# Patient Record
Sex: MALE | Race: WHITE | NOT HISPANIC OR LATINO | Employment: PART TIME | URBAN - METROPOLITAN AREA
[De-identification: names, ages, dates, MRNs, and addresses within clinical notes are randomized per-mention and may not be internally consistent; named-entity substitution may affect disease eponyms.]

---

## 2017-03-27 ENCOUNTER — HOSPITAL ENCOUNTER (INPATIENT)
Facility: HOSPITAL | Age: 27
LOS: 3 days | Discharge: HOME | DRG: 897 | End: 2017-03-30
Attending: PSYCHIATRY & NEUROLOGY | Admitting: PSYCHIATRY & NEUROLOGY
Payer: MEDICAID

## 2017-03-27 DIAGNOSIS — F19.94 SUBSTANCE INDUCED MOOD DISORDER: ICD-10-CM

## 2017-03-27 DIAGNOSIS — F15.20 AMPHETAMINE USE DISORDER, SEVERE: ICD-10-CM

## 2017-03-27 DIAGNOSIS — F12.20 CANNABIS USE DISORDER, SEVERE, DEPENDENCE: ICD-10-CM

## 2017-03-27 DIAGNOSIS — F13.20 MODERATE OTHER SEDATIVE OR HYPNOTIC USE DISORDER: ICD-10-CM

## 2017-03-27 DIAGNOSIS — F15.94 AMPHETAMINE-INDUCED MOOD DISORDER: ICD-10-CM

## 2017-03-27 DIAGNOSIS — F60.9 UNSPECIFIED PERSONALITY DISORDER: ICD-10-CM

## 2017-03-27 PROBLEM — F34.1 DYSTHYMIA: Status: ACTIVE | Noted: 2017-03-27

## 2017-03-27 PROCEDURE — 12400001 HC PSYCH SEMI-PRIVATE ROOM

## 2017-03-27 RX ORDER — DOCUSATE SODIUM 100 MG/1
100 CAPSULE, LIQUID FILLED ORAL DAILY PRN
Status: DISCONTINUED | OUTPATIENT
Start: 2017-03-27 | End: 2017-03-30 | Stop reason: HOSPADM

## 2017-03-27 RX ORDER — IBUPROFEN 200 MG
24 TABLET ORAL
Status: DISCONTINUED | OUTPATIENT
Start: 2017-03-27 | End: 2017-03-30 | Stop reason: HOSPADM

## 2017-03-27 RX ORDER — LOPERAMIDE HYDROCHLORIDE 2 MG/1
2 CAPSULE ORAL
Status: DISCONTINUED | OUTPATIENT
Start: 2017-03-27 | End: 2017-03-30 | Stop reason: HOSPADM

## 2017-03-27 RX ORDER — OLANZAPINE 10 MG/1
10 TABLET ORAL EVERY 4 HOURS PRN
Status: DISCONTINUED | OUTPATIENT
Start: 2017-03-27 | End: 2017-03-30 | Stop reason: HOSPADM

## 2017-03-27 RX ORDER — OLANZAPINE 10 MG/2ML
10 INJECTION, POWDER, FOR SOLUTION INTRAMUSCULAR EVERY 4 HOURS PRN
Status: DISCONTINUED | OUTPATIENT
Start: 2017-03-27 | End: 2017-03-30 | Stop reason: HOSPADM

## 2017-03-27 RX ORDER — GLUCAGON 1 MG
1 KIT INJECTION
Status: DISCONTINUED | OUTPATIENT
Start: 2017-03-27 | End: 2017-03-30 | Stop reason: HOSPADM

## 2017-03-27 RX ORDER — IBUPROFEN 200 MG
16 TABLET ORAL
Status: DISCONTINUED | OUTPATIENT
Start: 2017-03-27 | End: 2017-03-30 | Stop reason: HOSPADM

## 2017-03-27 RX ORDER — HYDROXYZINE PAMOATE 50 MG/1
50 CAPSULE ORAL NIGHTLY PRN
Status: DISCONTINUED | OUTPATIENT
Start: 2017-03-27 | End: 2017-03-30 | Stop reason: HOSPADM

## 2017-03-27 RX ORDER — IBUPROFEN 400 MG/1
400 TABLET ORAL EVERY 6 HOURS PRN
Status: DISCONTINUED | OUTPATIENT
Start: 2017-03-27 | End: 2017-03-30 | Stop reason: HOSPADM

## 2017-03-27 NOTE — NURSING
Admitted ambulatory from [facility] accompanied by staff. Calm and cooperative with assessment. Valuables and sharp checked. [facility] office notified. Denies SI/HI/AVH. Patient stated his wife feels patient might hurt self and others. Reported last used of meth the night PTA. Oriented to unit. Will continue to monitor patient.
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
## 2017-03-27 NOTE — IP AVS SNAPSHOT
[facility]  [address]  [facility] [address]  Phone: [phone]           Patient Discharge Instructions   Our goal is to set you up for success. This packet includes information on your condition, medications, and your home care.  It will help you care for yourself to prevent having to return to the hospital.     Please ask your nurse if you have any questions.      There are many details to remember when preparing to leave the hospital. Here is what you will need to do:    1. Take your medicine. If you are prescribed medications, review your Medication List on the following pages. You may have new medications to  at the pharmacy and others that you'll need to stop taking. Review the instructions for how and when to take your medications. Talk with your doctor or nurses if you are unsure of what to do.     2. Go to your follow-up appointments. Specific follow-up information is listed in the following pages. Your may be contacted by a nurse or clinical provider about future appointments. Be sure we have all of the phone numbers to reach you. Please contact your provider's office if you are unable to make an appointment.     3. Watch for warning signs. Your doctor or nurse will give you detailed warning signs to watch for and when to call for assistance. These instructions may also include educational information about your condition. If you experience any of warning signs to your health, call your doctor.           [facility]  Unless otherwise directed by your provider, please   contact [facility], our nurse care line   that is available for 24/7 assistance.     [phone] (toll-free)     Registered nurses in the [facility]   provide: appointment scheduling, clinical advisement, health education, and other advisory services.                  ** Verify the list of medication(s) below is accurate and up to date. Carry this with you in case of  emergency. If your medications have changed, please notify your healthcare provider.             Medication List      START taking these medications        Additional Info                      ziprasidone 40 MG Cap   Commonly known as:  GEODON   Quantity:  30 capsule   Refills:  0   Dose:  40 mg   Indications:  mood stabilization    Last time this was given:  40 mg on 3/29/2017  8:44 PM   Instructions:  Take 1 capsule (40 mg total) by mouth every evening.     Begin Date    AM    Noon    PM    Bedtime         CONTINUE taking these medications        Additional Info                      ALBUTEROL INHL   Refills:  0    Instructions:  Inhale into the lungs.     Begin Date    AM    Noon    PM    Bedtime            Where to Get Your Medications      You can get these medications from any pharmacy     Bring a paper prescription for each of these medications     ziprasidone 40 MG Cap                  Please bring to all follow up appointments:    1. A copy of your discharge instructions.  2. All medicines you are currently taking in their original bottles.  3. Identification and insurance card.    Please arrive 15 minutes ahead of scheduled appointment time.    Please call 24 hours in advance if you must reschedule your appointment and/or time.        Follow-up Information     Go to [facility].    Specialties:  Behavioral Health, Psychiatry, Psychology    Why:  aftercare appt. WALK IN ONLY. M-F 8-4:30 P.M. BRING PHOTO ID AND PROOF OF RESIDENCY.    Contact information:    [address]  [address]  [place] [id number] [phone]          Go to [facility].    Why:  appointment for an assessment. TODAY AT 2:30 PM    Contact information:    ZENAIDA KEARNEY  [address] [place] [address]   [phone]        Discharge Instructions     Future Orders    Activity as tolerated     Call MD for:     Comments:    Suicidal or homicidal ideation; change in behavior    Diet general     Questions:    Total calories:       Fat restriction, if any:      Protein restriction, if any:      Na restriction, if any:      Fluid restriction:      Additional restrictions:          Primary Diagnosis     Your primary diagnosis was:  Substance Induced Mood Disorder      Admission Information     Date & Time Provider Department CSN    3/27/2017  2:46 PM Jered Guadarrama MD [facility] [id number]       Admisson Diagnosis: Substance induced mood disorder      Care Providers     Provider Role Specialty Primary office phone    Jered Guadarrama MD Attending Provider Psychiatry [phone]      Your Vitals Were     BP Pulse Temp Resp Height Weight    127/73 (BP Location: Right arm, Patient Position: Sitting, BP Method: Automatic) 74 98.7 °F (37.1 °C) (Oral) 16 6' (1.829 m) 74.6 kg (164 lb 7.4 oz)    BMI                22.31 kg/m2          Recent Lab Values     No lab values to display.      Blood Glucose and Lipid Panel Labs        3/29/2017                           5:29 AM           Cholesterol 126           Triglycerides 40           HDL Cholesterol 53           LDL Cholesterol 65.0           HDL/Cholesterol Ratio 42.1           Total Cholesterol/HDL Ratio 2.4           Non-HDL Cholesterol 73           Comment for Cholesterol at  5:29 AM on 3/29/2017:  The National Cholesterol Education Program (NCEP) has set the  following guidelines (reference ranges) for Cholesterol:  Optimal.....................<200 mg/dL  Borderline High.............200-239 mg/dL  High........................> or = 240 mg/dL      Comment for Triglycerides at  5:29 AM on 3/29/2017:  The National Cholesterol Education Program (NCEP) has set the  following guidelines (reference values) for triglycerides:  Normal......................<150 mg/dL  Borderline High.............150-199 mg/dL  High........................200-499 mg/dL      Comment for HDL Cholesterol at  5:29 AM on 3/29/2017:  The National Cholesterol Education Program (NCEP) has set  the  following guidelines (reference values) for HDL Cholesterol:  Low...............<40 mg/dL  Optimal...........>60 mg/dL      Comment for LDL Cholesterol at  5:29 AM on 3/29/2017:  The National Cholesterol Education Program (NCEP) has set the  following guidelines (reference values) for LDL Cholesterol:  Optimal.......................<130 mg/dL  Borderline High...............130-159 mg/dL  High..........................160-189 mg/dL  Very High.....................>190 mg/dL      Comment for Non-HDL Cholesterol at  5:29 AM on 3/29/2017:  Risk category and Non-HDL cholesterol goals:  Coronary heart disease (CHD)or equivalent (10-year risk of CHD >20%):  Non-HDL cholesterol goal     <130 mg/dL  Two or more CHD risk factors and 10-year risk of CHD <= 20%:  Non-HDL cholesterol goal     <160 mg/dL  0 to 1 CHD risk factor:  Non-HDL cholesterol goal     <190 mg/dL        Allergies as of 3/30/2017     No Known Allergies      Advance Directives     An advance directive is a document which, in the event you are no longer able to make decisions for yourself, tells your healthcare team what kind of treatment you do or do not want to receive, or who you would like to make those decisions for you.  If you do not currently have an advance directive, [facility] encourages you to create one.  For more information call:  [phone]), [phone] ([phone]),  or log on to [email or web address].        Advance Directive Status          Most Recent Value    Advance Directive Status  Patient does not have Advance Directive, declines information.      Smoking Cessation     If you would like to quit smoking:   You may be eligible for free services if you are a [place] resident and started smoking cigarettes before September 1, 1988.  Call the Smoking Cessation Trust (SCT) toll free at (370) 856-6746 or (937) 087-8310.   Call 3-806-QUIT-NOW if you do not meet the above criteria.   Contact us via email:  [email or web address]   View our website for more information: [email or web address]        Language Assistance Services     ATTENTION: Language assistance services are available, free of charge. Please call [phone].      ATENCIÓN: Si augusta watt, tiene a turner disposición servicios gratuitos de asistencia lingüística. Llame al [phone].     CHÚ Ý: N?u b?n nói Ti?ng Vi?t, có các d?ch v? h? tr? ngôn ng? mi?n phí dành cho b?n. G?i s? [phone].        National Suicide Prevention Lifeline     If you or someone you know is thinking about suicide, call the National Suicide Prevention Lifeline.  [place] Suicide Hotline: 3-227-102-TALK (5917)  The lifeline is free, confidential and always available.  Help a loved one, a friend or yourself.  www.suicideprevenetionlifeline.org          [facility] Sign-Up     Activating your [facility] account is as easy as 1-2-3!     1) Visit [email or web address], select Sign Up Now, enter this activation code and your date of birth, then select Next.  [email or web address]  Expires: 5/14/2017 10:35 AM      2) Create a username and password to use when you visit [facility] in the future and select a security question in case you lose your password and select Next.    3) Enter your e-mail address and click Sign Up!    Additional Information  If you have questions, please e-mail [email or web address] or call [phone] to talk to our [facility] staff. Remember, [facility] is NOT to be used for urgent needs. For medical emergencies, dial 911.          [facility] complies with applicable Federal civil rights laws and does not discriminate on the basis of race, color, national origin, age, disability, or sex.

## 2017-03-28 PROBLEM — F15.20 AMPHETAMINE AND OTHER PSYCHOSTIMULANT DEPENDENCE, CONTINUOUS: Status: RESOLVED | Noted: 2017-03-27 | Resolved: 2017-03-28

## 2017-03-28 PROBLEM — F34.1 DYSTHYMIA: Status: RESOLVED | Noted: 2017-03-27 | Resolved: 2017-03-28

## 2017-03-28 PROCEDURE — 25000003 PHARM REV CODE 250: Performed by: PSYCHIATRY & NEUROLOGY

## 2017-03-28 PROCEDURE — 99233 SBSQ HOSP IP/OBS HIGH 50: CPT | Mod: ,,, | Performed by: PSYCHIATRY & NEUROLOGY

## 2017-03-28 PROCEDURE — 12400001 HC PSYCH SEMI-PRIVATE ROOM

## 2017-03-28 RX ORDER — SERTRALINE HYDROCHLORIDE 50 MG/1
50 TABLET, FILM COATED ORAL DAILY
Status: DISCONTINUED | OUTPATIENT
Start: 2017-03-28 | End: 2017-03-29

## 2017-03-28 RX ADMIN — SERTRALINE HYDROCHLORIDE 50 MG: 50 TABLET ORAL at 12:03

## 2017-03-28 NOTE — PROGRESS NOTES
03/27/17 2000   Activity/Group Therapy Checklist   Group Goals/Reflection   Attendance Attended   Follows Direction Followed directions   Group Interactions/Observations Interacted appropriately   Affect/Mood Range Normal range   Affect/Mood Display Appropriate
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
## 2017-03-28 NOTE — ASSESSMENT & PLAN NOTE
- Zoloft 50 mg daily for anxiety  - PRN:  Zyprexa 10 mg PO/IM q6h for agitation  - PRN:  Vistaril 50 mg qhs for sleep

## 2017-03-28 NOTE — MEDICAL/APP STUDENT
"Psychiatry Inpatient Progress Note      Subjective:     Patient seen and examined on 03/28/2017    Patient got into argument with his family after they found out that he was using methamphetamine. After they found out about his drug usage, he kicked out his dad and sister out of his house. He then got into an argument with his wife, and found out that his wife had called both his dad and sister to their house in order to address his methamphetamine usage.     When asked about his methamphetamine usage, patient claims that he feels like he "is in control of the drugs" and that it is "obviously a bad thing...but has it under control to certain degree."     Patient says he uses a gram every 2-3 weeks. He always was using "at least a little a day." Claims he initially started to use it for "work" after his Adderall connection dropped out.    His wife was not happy about his usage because her dad ruined his life. He claims that the first time he used it was about a year ago with his wife's father. His wife is worried because he was not sleeping with her. Denies hallucinations and paranoias.     Patient admits to using only a little weed (smoked a lot before trying meth, but only smokes a little now) and klonopin (Most recent usage - Saturday Morning; 2nd most recent usage - "about two days before). Claims he only uses it to "take the edge off the meth."     Patient does not agree with Bipolar diagnosis. He used to see a psychiatrist for BPD + depression while he was in [place] (3-4 years ago). Sx's included anger, indfference to living,     Has tried several different meds (Seroquil for "a couple of months." - limited thoughts and speech, and felt "dumb" - claims he tried "the lower dosage and the higher dosage." ). Also tried Latuda, which gave him "more suicidal thoughts." Eventually decided to stop taking medications as a whole, and claims that "everything was all right for some time."     When asked why he thought " "his wife called his dad and sister, he says that "she probably felt powerless." He claims he initially blamed her, but now acknowledges tthat she did a good thing cause he would have probably continued to deny his meth usage.     Patient says he is okay with returing to medications and is okay with doing rehab as long as "it doesn't take him out of his real life and the things that he loves (dogs, wife, house)."     Claims he has never tried any SSRIs or mirtazapine. Denies depressive symptoms and has a "good outlook on life."     Claims he slept well last night and feels much better than when he was admitted.     Objective:     Current Medications:   Scheduled:       PRN:   dextrose 50%, dextrose 50%, docusate sodium, glucagon (human recombinant), glucose, glucose, glucose, hydrOXYzine pamoate, ibuprofen, loperamide, olanzapine **AND** olanzapine    Allergies:   Review of patient's allergies indicates no known allergies.    Vitals:   Temp:  [98 °F (36.7 °C)-98.7 °F (37.1 °C)]   Pulse:  [65-78]   Resp:  [16-17]   BP: (115-124)/(62-81)     Mental Status Exam:  Appearance: unremarkable, age appropriate, normal weight, well nourished, bearded, casually dressed, neatly groomed   Behavior: normal, cooperative, friendly and cooperative, eye contact normal   Speech: normal tone, normal rate, normal pitch, normal volume   Mood: happy, "good."   Affect: mood congruent   Thought Process:  normal and logical   Thought Content: normal, no suicidality, no homicidality, delusions, or paranoia   Orientation: grossly intact, person, place, situation, time/date, day of week, month of year, year   Cognition: grossly intact   Insight: good   Judgment: good     Laboratory Data:   No results found for this or any previous visit (from the past 48 hour(s)).     Imaging:  Imaging Results     None        Assessment:     Tyler Glynn is a 26 y.o. male with a hx of anxiety and methamphetamine usage who was admitted on 3/27/2017 due to " drug-induced anxiety and agitation.     Plan:     · Collateral from parents, sister, wife.   · Find appropriate rehabilitation day programs    Legal: [facility]  Dispo: Pending symptom stabilization.    - Christiano Gonzalez (M3)

## 2017-03-28 NOTE — ASSESSMENT & PLAN NOTE
- Pt reports daily use of methamphetamine x3-4 mos with severe anxiety.  Denies panic attacks.  - Utox positive for amphetamine

## 2017-03-28 NOTE — ASSESSMENT & PLAN NOTE
- Pt reports h/o daily use, but he reduced use in the past 3-4 mos with meth usage.  - Utox: (+) THC
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
## 2017-03-28 NOTE — PROGRESS NOTES
"joan met with Mr. Glynn and discussed substance abuse treatment options. joan highlighted numbers on rehab list pt could start calling. pt asked what the treatment centers are for. Joan explained these are substance abuse treatment centers that can help him with drug addiction and pt reports he does not have a drug problem. "My family  Thinks I have a drug problem because I have been hiding it from them for 3 months, but I can drop it at any time I want."     Joan offered support and help if he is ready to do so. Pt thanked joan and said he will speak with his family about his thoughts on the rehab issue.  "

## 2017-03-28 NOTE — PROGRESS NOTES
03/28/17 1200   Patient/Family Goals   Goal Formulation With patient   Time For Goal Achievement 3 days   Goal 1 attend actvities, relaxation, and education groups daily   Assessment   Leisure Interest Listen to Music;Arts and Crafts;Movies;Enjoy Family/Friends;Computer;Sports   Leisure Barriers Social Support;In Pain   Treatment Focus To Improve Mood;To Educate and Increase Awareness of Sober Free Activities;To Improve Coping Skills;To Decrease Agitation and Increase Frustration Tolerance

## 2017-03-28 NOTE — SUBJECTIVE & OBJECTIVE
"Interval History:   Pt reports "alright" mood.  Pt interested in rehab, but he is concerned about prolonged residential rehab stay 2/2 work and life responsibilities.      Pt reports daily use of methamphetamine x3-4 months.  Pt had been keeping it secret from his family until family found out.  He uses it to "go faster and help me ignore verbal abusive from boss" at work as .  Was originally introduced to meth by father-in-law.  Pt's wife is upset with pt using drugs b/c it makes him irritable and he does not "sleep with her."  Endorses "very little" use of marijuana and Klonopin.  Last used Klonopin three days ago and before that a few days before.  Uses "1/2 a pill" every few days when he gets "too hyped up" on the meth.  Cut back on THC after starting using meth.  Denies other illicits or alcohol.      Denies h/o bipolar diagnosis.  Pt explains that his wife "mis-said it."  He used to see psychiatrist, Dr. Woodward in [place], for borderline personality disorder and depression with passive SI.  H/O "really bad anger."  Past trials of dose-unknown Seroquel ("dumbed me down, limited my thoughts and speech"), Latuda (increased SI).       Denies SI, HI, AVH; no evidence of paranoia or delusions.            Psychotherapeutics     Start     Stop Route Frequency Ordered    03/27/17 1458  olanzapine tablet 10 mg  (Olanzapine)      -- Oral Every 4 hours PRN 03/27/17 1458    03/27/17 1458  olanzapine injection 10 mg  (Olanzapine)      -- IM Every 4 hours PRN 03/27/17 1458           Review of Systems  Objective:     Vital Signs (Most Recent):  Temp: 98 °F (36.7 °C) (03/28/17 0800)  Pulse: 65 (03/28/17 0800)  Resp: 17 (03/28/17 0800)  BP: 120/66 (03/28/17 0800) Vital Signs (24h Range):  Temp:  [98 °F (36.7 °C)-98.7 °F (37.1 °C)] 98 °F (36.7 °C)  Pulse:  [65-78] 65  Resp:  [16-17] 17  BP: (115-124)/(62-81) 120/66     Height: 6' (182.9 cm)  Weight: 74.6 kg (164 lb 7.4 oz)  Body mass index is 22.31 " "kg/(m^2).    No intake or output data in the 24 hours ending 03/28/17 1042    ROS:  No tremors, diaphoresis, palpitations, CP, SOB, GI issues.    Physical Exam     Mental Status Exam:   Arousal: Alert, awake  Appearance: fair grooming  Sensorium/Orientation: Oriented to person, place, situation, month and year  Behavior/Cooperation: Cooperative, friendly  Psychomotor: No PMA or PMR  Speech: Increased rate, and volume; interruptible; normal rhythm, prosody and tone  Language: Fluent english  Mood: "Upset"  Affect: Reactive, mood congruent  Thought Process: Linear   Thought Content: Patient denies current or recent SI; No HI; no hallucinations or delusions elicited  Associations: Intact  Attention/Concentration: Intact  Memory: Recent and remote intact  Fund of Knowledge: Appropriate for education level   Insight: Limited  Judgment: Impaired    Significant Labs:   Last 24 Hours:   Recent Lab Results     None          Significant Imaging: None  "

## 2017-03-28 NOTE — ASSESSMENT & PLAN NOTE
- Pt reports daily use of methamphetamine x3-4 mos with severe anxiety.  Denies panic attacks.  - Utox positive for amphetamine  - Pt interested in rehab.  SW will assist in placement with residential rehab facility.

## 2017-03-28 NOTE — PROGRESS NOTES
03/28/17 0900 03/28/17 1000 03/28/17 1100   [facility] Group Therapy   Group Name Community Reintegration Education (guided imagery)   Specific Interventions Current Events Relapse Prevention Relaxation Training   Participation Level Active;Appropriate;Attentive Active;Appropriate;Attentive Active;Appropriate;Attentive   Participation Quality Cooperative;Social Cooperative;Social Cooperative;Social   Insight/Motivation Good Good Good   Affect/Mood Display Appropriate Appropriate Appropriate   Cognition Oriented;Alert Alert;Oriented Alert;Oriented       03/28/17 1400   [facility] Group Therapy   Group Name Therapeutic Recreation   Specific Interventions (basketball toss)   Participation Level Active;Appropriate;Attentive   Participation Quality Cooperative   Insight/Motivation Good   Affect/Mood Display Appropriate   Cognition Alert;Oriented

## 2017-03-28 NOTE — PLAN OF CARE
Problem: Patient Care Overview (Adult)  Goal: Plan of Care Review  Outcome: Ongoing (interventions implemented as appropriate)  Patient is cooperative with routines.  Attends groups and activities.  Cont to report having family issues.  Patient started on Zoloft.  Cooperative with medication  No detox symptoms noted.      Problem: Impaired Control (Excessive Substance Use) (Adult)  Goal: Participates in Recovery Program  Outcome: Ongoing (interventions implemented as appropriate)  Patient reports he plans to go  To rehab upon discharge.  Patient given a list of rehabs in the area to call.

## 2017-03-28 NOTE — ASSESSMENT & PLAN NOTE
"- Pt reports unknown dosage of Klonopin use every three days when he is overly "hyped up" from meth use.  - No s/s of withdrawal  "

## 2017-03-28 NOTE — MEDICAL/APP STUDENT
"Collateral:    Patient said it was okay to contact his dad, mom, and wife in regards to his admission and gain collateral. Patient provided the following numbers:     - Morenita (Mother): [phone]  - Vanda (Wife): [phone]  - Jonathan (Dad): [phone]    Phone Call With Vanda (Wife):    2.5 years ago - Tyler had mental break from excessive energy drinks - he wasn't eating or drinking anything as well.     ~1 yr. Ago - adderall usage started to her knowledge - Tyler claimed that he might have ADD and that he thinks he should be prescribed because it was good for his attention span. She was not happy with him taking the medication, but said that if he really thinks he needs it, he should go to a doctor and get it prescribed.     Found out about 4 months ago that he was using. Tyler claimed that he had only "tried it once." Soon after, she was suspicious about his behavior, but she did not think it was meth related because she assumed he did not have any access. Suspicious behavior included change in sleeping patterns, but she was aware that he had a background of mental illness (bipolar, borderline personality, depression, or something...wasn't completely sure), so she thought he was possibly having a manic episode.       Appearance and sleep were getting much worse over the past 4 months - losing a lot of weight, yelling a lot, never came to bed, very negative.     Had a lot of time alone during emi gras season () - confessed to her that he was doing meth and he was addicted.     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 Felt a lot of pressures from home, finances, thought the meth would help him.     Several on and off meth breaks. The first time she saw the paraphernalia was about 1.5-2 months ago. At 3AM in the morning, he was in the bath tub, music blasting, and was passed out. She was terrified. He was not waking up readily, " "but eventually woke up. The next morning, he got up and used, and she walked into the room with him passed out on the floor.  After that, he just started to leave goods out in the open.     At some point, he took two weeks of "medical leave" from his job to potentially use.     Got money out of mom to "help sister with her coke addiction" but instead used the money to pay their rent - sister couldn't handle situation and left. Dad came out to help. Tyler was very argumentative and confronting, but normally he would be very happy and accepting if he saw his dad and sister. After kicking out dad and sister, he started yelling at his wife, and claimed he was "going to take all the crystal meth and die." Next morning, they got the order in line to admit him to the hospital.     Used to be relatively heavy set (190-200lb). Noticed most dramatic weight loss over the past 4 months.     In regards to treatment plan, wife and dad both agree that he should be admitted to rehab for a month so he can be held accountable. She claims he is very intelligent and might be saying things just to get out of the hospital.     Family searched house - found a lot of stuff, and threw all the paraphernalia away after he was admitted. When he was running from [email or web address], they suspected him of smoking meth just up until he was admitted to the hospital. Later found the pipe underneath the house/hedges.    Mother is coming to visit son in hospital. She is a , who is a "very powerful woman." She might be persuaded by her son to get him out of the hospital potentially sooner than necessary.    - Christiano Gonzalez (M3)   "

## 2017-03-29 LAB
CHOLEST/HDLC SERPL: 2.4 {RATIO}
HDL/CHOLESTEROL RATIO: 42.1 %
HDLC SERPL-MCNC: 126 MG/DL
HDLC SERPL-MCNC: 53 MG/DL
LDLC SERPL CALC-MCNC: 65 MG/DL
NONHDLC SERPL-MCNC: 73 MG/DL
TRIGL SERPL-MCNC: 40 MG/DL

## 2017-03-29 PROCEDURE — 97165 OT EVAL LOW COMPLEX 30 MIN: CPT

## 2017-03-29 PROCEDURE — 36415 COLL VENOUS BLD VENIPUNCTURE: CPT

## 2017-03-29 PROCEDURE — 25000003 PHARM REV CODE 250: Performed by: PSYCHIATRY & NEUROLOGY

## 2017-03-29 PROCEDURE — 80061 LIPID PANEL: CPT

## 2017-03-29 PROCEDURE — 12400001 HC PSYCH SEMI-PRIVATE ROOM

## 2017-03-29 PROCEDURE — 90853 GROUP PSYCHOTHERAPY: CPT | Mod: ,,, | Performed by: PSYCHOLOGIST

## 2017-03-29 PROCEDURE — 99233 SBSQ HOSP IP/OBS HIGH 50: CPT | Mod: ,,, | Performed by: PSYCHIATRY & NEUROLOGY

## 2017-03-29 RX ORDER — ZIPRASIDONE HYDROCHLORIDE 40 MG/1
40 CAPSULE ORAL NIGHTLY
Status: DISCONTINUED | OUTPATIENT
Start: 2017-03-29 | End: 2017-03-30 | Stop reason: HOSPADM

## 2017-03-29 RX ADMIN — SERTRALINE HYDROCHLORIDE 50 MG: 50 TABLET ORAL at 08:03

## 2017-03-29 RX ADMIN — ZIPRASIDONE HYDROCHLORIDE 40 MG: 40 CAPSULE ORAL at 08:03

## 2017-03-29 NOTE — SUBJECTIVE & OBJECTIVE
Interval History: Patient states he is doing well and slept all night. He complains of feeling drowsy. His anxiety is under control. He reports cravings for sweets, denies cravings for drugs. He visited with mother and wife last night.  We discussed family's concern about patient having a bipolar diagnosis and a period when he appeared manic and had an idea to sell organic red bull. He acknowledges this episodes and denies substance abuse at the time. Patient states he invested some money into a product company and became a . He was having meetings at his house, eventually invited family to join but they were very critical. This led to the patient locking himself in the garage for 3-4 days; pt states he would get out through a window but they didn't know he was leaving the garage. Patient endorses staying up for 3-4 days, researching on a computer and hallucinating. He does report drinking the organic red bull during this time. He was hospitalized in [place] and placed on medication. Does not remember the name. Patient was informed of the concern that antidepressants inducing aramis.   We also discussed patients view on rehab. He is open to IOP. Agrees to meet with [facility] for night program. He is future oriented, and looks forward to returning to work.     PMHx  Past Medical History Reviewed    ROS  General ROS: drowsy  Gastrointestinal ROS: denies upset stomach, nausea or vomiting  Musculoskeletal ROS: denies pain      EXAMINATION    VITALS   Vitals:    03/29/17 0740   BP: (!) 149/81   Pulse: 73   Resp: 16   Temp:        CONSTITUTIONAL  General Appearance: stated age, neatly groomed, average weight    MUSCULOSKELETAL  Muscle Strength and Tone: no abnormalities  Abnormal Involuntary Movements: none  Gait and Station: ambulates without assist ence     PSYCHIATRIC   Arousal: Alert, awake  Appearance: neatly groomed  Sensorium/Orientation: Oriented to person, place, situation  Behavior/Cooperation:  "Cooperative, friendly  Psychomotor: No PMA or PMR  Speech: Normal rate, tone and volume  Language: Fluent english, able to name, spontaneous   Mood: "alright"  Affect: attentive, mood congruent  Thought Process: Linear, goal directed  Thought Content: Patient denies current or recent SI; No HI; no hallucinations or delusions elicited  Associations: Intact  Attention/Concentration: Intact  Memory: Recent and remote intact  Fund of Knowledge: Appropriate for education level   Insight: fair  Judgment: fair      Psychotherapeutics     Start     Stop Route Frequency Ordered    03/27/17 1458  olanzapine tablet 10 mg  (Olanzapine)      -- Oral Every 4 hours PRN 03/27/17 1458    03/27/17 1458  olanzapine injection 10 mg  (Olanzapine)      -- IM Every 4 hours PRN 03/27/17 1458    03/28/17 1130  sertraline tablet 50 mg      -- Oral Daily 03/28/17 1104           Review of Systems  Objective:     Vital Signs (Most Recent):  Temp: 98.6 °F (37 °C) (03/28/17 1910)  Pulse: 73 (03/29/17 0740)  Resp: 16 (03/29/17 0740)  BP: (!) 149/81 (03/29/17 0740) Vital Signs (24h Range):  Temp:  [98.6 °F (37 °C)] 98.6 °F (37 °C)  Pulse:  [62-73] 73  Resp:  [16-20] 16  BP: (124-150)/(66-81) 149/81     Height: 6' (182.9 cm)  Weight: 74.6 kg (164 lb 7.4 oz)  Body mass index is 22.31 kg/(m^2).    No intake or output data in the 24 hours ending 03/29/17 0819    Physical Exam     Significant Labs:   Last 24 Hours:   Recent Lab Results       03/29/17  0529      Cholesterol 126  Comment:  The National Cholesterol Education Program (NCEP) has set the  following guidelines (reference ranges) for Cholesterol:  Optimal.....................<200 mg/dL  Borderline High.............200-239 mg/dL  High........................> or = 240 mg/dL       HDL 53  Comment:  The National Cholesterol Education Program (NCEP) has set the  following guidelines (reference values) for HDL Cholesterol:  Low...............<40 mg/dL  Optimal...........>60 mg/dL       HDL/Chol " Ratio 42.1     LDL Cholesterol 65.0  Comment:  The National Cholesterol Education Program (NCEP) has set the  following guidelines (reference values) for LDL Cholesterol:  Optimal.......................<130 mg/dL  Borderline High...............130-159 mg/dL  High..........................160-189 mg/dL  Very High.....................>190 mg/dL       Non-HDL Cholesterol 73  Comment:  Risk category and Non-HDL cholesterol goals:  Coronary heart disease (CHD)or equivalent (10-year risk of CHD >20%):  Non-HDL cholesterol goal     <130 mg/dL  Two or more CHD risk factors and 10-year risk of CHD <= 20%:  Non-HDL cholesterol goal     <160 mg/dL  0 to 1 CHD risk factor:  Non-HDL cholesterol goal     <190 mg/dL       Total Cholesterol/HDL Ratio 2.4     Triglycerides 40  Comment:  The National Cholesterol Education Program (NCEP) has set the  following guidelines (reference values) for triglycerides:  Normal......................<150 mg/dL  Borderline High.............150-199 mg/dL  High........................200-499 mg/dL             Significant Imaging: None

## 2017-03-29 NOTE — PLAN OF CARE
Problem: Patient Care Overview (Adult)  Goal: Plan of Care Review  Outcome: Ongoing (interventions implemented as appropriate)  Pt calm and cooperative. Pleasant on approach. Compliant with medication. Pt out in the milieu. No new physical complaints. No signs or symptoms of withdrawal. Showered and attends to ADLs. Dressed in street clothes. Denies SI/HI. Denies A/V hallucinations. Will continue to monitor.

## 2017-03-29 NOTE — PROGRESS NOTES
03/29/17 0900 03/29/17 1000 03/29/17 1300   [facility] Group Therapy   Group Name Community Reintegration Mental Awareness Therapeutic Recreation   Specific Interventions Current Events (team building game) (team pictionary )   Participation Level Active;Appropriate;Attentive Active;Appropriate;Attentive Active;Appropriate;Attentive   Participation Quality Cooperative;Social Cooperative;Social Cooperative;Social   Insight/Motivation Good Good Good   Affect/Mood Display Appropriate Appropriate Appropriate   Cognition Alert;Oriented Alert;Oriented Alert;Oriented

## 2017-03-29 NOTE — ASSESSMENT & PLAN NOTE
- Discontinue Zoloft, patients history is concerning for bipolar disorder and SSRIs have risk of inducing aramis  - Begin trial of Geodon 40mg po qhs  - PRN:  Zyprexa 10 mg PO/IM q6h for agitation  - PRN:  Vistaril 50 mg qhs for sleep

## 2017-03-29 NOTE — PROGRESS NOTES
Sw spoke to pt's mother extensively regarding pt's history and substance abuse treatment. Mom flew in from [place] to intervene and hope to place pt in a program.  Mom reports pt had a manic episode two years ago. Pt was treated at a behavioral unit in [place] at that time and no formal diagnosis provided.     Mom also reports and is concerned pt may have a biological tendency for addiction due to family history on paternal side.  Ko has investigated options available for outpatient services based on mom's wishes and pt's agreement to start a program. Family meeting confirmed for tomorrow at 10 am to discuss hospitalization and next steps for follow up.

## 2017-03-29 NOTE — PROGRESS NOTES
Pt had ~ 8 hours of sleep. Pt remain free from fall or injury during HS. Continue to monitor pt safety and POC. Pt did not request any prn medicine last night he remains quiet and cooperative on the unit.

## 2017-03-29 NOTE — PROGRESS NOTES
Group Psychotherapy (PhD/LCSW)    Site: [facility]    Clinical status of patient: Inpatient    Date: 3/29/2017    Group Focus: Life Skills    Length of service: 80685 - 35-40 minutes    Number of patients in attendance: 6    Referred by: Acute Psychiatry Unit Treatment Team    Target symptoms: Substance Abuse and Mood Disorder    Patient's response to treatment: Active Listening and Self-disclosure    Progress toward goals: Progressing slowly    Interval History: Pt appeared alert and attentive in group. He participated appropriately He discussed being angry at first about being hospitalized but now he recognizes that he needed it. He notes that he is sleeping again.     Diagnosis: See Dr. Wiedemann's notes dated 3/29/17    Plan: Continue treatment on [facility]
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
## 2017-03-29 NOTE — PROGRESS NOTES
"OT Mental Health Evaluation    Name: Tyler Glynn  MRN:[id number]    Diagnosis: Substance induced mood disorder, OPC for danger to self, bipolar disorder, borderline personality disorder    PMH:   Past Medical History:   Diagnosis Date    Anxiety     Borderline personality disorder     History of psychiatric hospitalization     Hx of psychiatric care     Elisa     attributed to substance use    Psychiatric problem     Therapy     No past surgical history on file.    Precautions: PEC    Social History   Living environment: Lives with wife and 2 dogs in [facility]. Originally from [place], [place], moved her ~3 years ago. Pt's daughter lives in [place] with her mother, and his spouse's 2 children live in [place] with their paternal grandfather.   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 [employer]   Hobbies/leisure: Taking apart/putting together speakers   Stressors: Being admitted to hospital, contacting clients, financial burden of being only source of income for household as wife was laid off     Physical  Visual/Auditory: (-) VH/AH   Range of Motion/Strength: WFL      Sensation:WFL  Fine Motor/Coordination: WFL    Subjective "I feel a lot better now that I am here."  Pain: 0/10,    Positive Self-Affirmation: Not assessed    Coping strategies/skills: Pt reports he used to exercise a lot to get energy, but has stopped and states "I am cheating myself because I am using meth instead to get energy."    Objective:  Redford Cognitive Assessment (MOCA): DNT    Group: Gratitude  Purpose: Pt educated on importance of gratitude and positive thinking to increase coping with difficult situations, improve mental health and increase social participation. Wrote thank you letters to friends/family members. Pt encouraged to incorporate gratitude practices into personal thought and action within " daily routine.       Participation: did not attend group and sleeping during group    Appearance/Expression:  fair grooming, casual clothing and open to activity, fair eye contact    Activity Level: normal    Cooperation: willing to participate in evaluation interview    Socialization: appropriate to situation and quiet    Cognitive: goal directed, logical thought and future oriented    Orientation: oriented x4    Commands: followed appropriately    Mood/Affect: normal, calm and cooperative    Functional observations:Pt did not attend group today, as he was asleep, so all observations based off of evaluation interview. Pt reported that while detoxing from meth, he feels very tired/fatigued.    Additional treatment: Pt discussed changing his sleep patterns, as he typically goes to bed very late, working on home speaker projects. He wishes to go to bed near time of his wife (9-10 pm). OT discussed importance of sleep and offered strategies for developing a sleep routine and rearranging schedule to have better sleep schedule. Pt able to problem-solve and considered 1 resolution to assist with better sleep.    Leatha Scott is a 26 y.o. Garcia with a significant past medical history of bipolar disorder and borderline personality disorder, who was admitted to [facility] for PEC and substance induced mood disorder. He was a user of methamphetamines, which has interfered with sleeping patterns, leisurely activities, and stress management.  He  appears to have logical thought processes and future-oriented thinking in order to establish new and healthier routines. He would benefit from skilled OT intervention to promote stress management techniques and to assist pt with return to prior valued roles and routines.Pt is appropriate for continued OT services to address: group participation, emotional regulation, safety, self care  and to receive education related to healthy participation in daily roles and rotuines.    Goals: 4  sessions    1. Pt will attend group with Min encouragement.   2. Pt will remain in group 75% of session.   3. Pt will participate in group with zero encouragement.   4. Pt will  participate in group problem solving with zero verbal cues.   5. Pt will interact with 1-2 group members in immediate environment during session.   6. Pt will verbalize/demonstrate understanding of group purpose with 1 verbal cues at end of session.   7. Pt will report and demo understanding of 1 positive self-affirmation to use to as coping skills.   8. Pt will verbalize/demo understanding and identify use of 1-2 coping skills to use when upset.   9. Pt will identify 2-3 strategies for developing better sleep routine.    Patient Goals:  1.To get into an Outpatient Rehab facility  2. To sleep better  3. To finish current speaker project        Billable Minutes: Evaluation 21 min, Therapeutic Group 0 min    Referring physician: Hailee Nielson MD   Date referred to OT: 3/27/17       03/29/17 1125   General   OT Date of Treatment 03/29/17   OT Start Time 1125   OT Stop Time 1146   OT Total Time (min) 21 min   Plan   Therapy Frequency 3 x/week   Planned Interventions self-care/home management;community/work re-entry;therapeutic activities;therapeutic exercises;therapeutic groups;cognitive retraining;sensory integration   Plan of Care Expires on 04/28/17   Occupational Therapy Follow-up   OT Follow-up? Yes   Plan of Care Review   Plan Of Care Reviewed With patient       RICARDO Guaman  3/29/2017

## 2017-03-29 NOTE — PROGRESS NOTES
joan spoke to Keke with [facility] and she will meet with pt today in the unit at 1pm. Keke will conduct an assessment for possible substance abuse treatment.    Keke will meet with pt's parents afterwards outside the unit.    oJan has also made referral to [facility] for possible start date on Tuesday.

## 2017-03-30 VITALS
HEIGHT: 72 IN | TEMPERATURE: 99 F | DIASTOLIC BLOOD PRESSURE: 73 MMHG | SYSTOLIC BLOOD PRESSURE: 127 MMHG | RESPIRATION RATE: 16 BRPM | WEIGHT: 164.44 LBS | BODY MASS INDEX: 22.27 KG/M2 | HEART RATE: 74 BPM

## 2017-03-30 PROCEDURE — 99239 HOSP IP/OBS DSCHRG MGMT >30: CPT | Mod: ,,, | Performed by: PSYCHIATRY & NEUROLOGY

## 2017-03-30 PROCEDURE — 97150 GROUP THERAPEUTIC PROCEDURES: CPT

## 2017-03-30 RX ORDER — ZIPRASIDONE HYDROCHLORIDE 40 MG/1
40 CAPSULE ORAL NIGHTLY
Qty: 30 CAPSULE | Refills: 0 | Status: SHIPPED | OUTPATIENT
Start: 2017-03-30 | End: 2018-03-30

## 2017-03-30 NOTE — PROGRESS NOTES
joan called [facility] and asked to set up an assessment appt on behalf of pt.  will let  know and will call joan back.

## 2017-03-30 NOTE — PROGRESS NOTES
Orders for discharge received.  Information on home medication and follow up care provided.  Verbalized understanding of discharge instructions.  Denies SI or HI.  All belongings gathered.  Escorted to exit by staff to his mother and wife.
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
## 2017-03-30 NOTE — ASSESSMENT & PLAN NOTE
- Discontinue Zoloft, patients history is concerning for bipolar disorder and SSRIs have risk of inducing aramis  - Continue Geodon 40mg po qhs  - PRN:  Zyprexa 10 mg PO/IM q6h for agitation  - PRN:  Vistaril 50 mg qhs for sleep

## 2017-03-30 NOTE — ASSESSMENT & PLAN NOTE
- Pt reports daily use of methamphetamine x3-4 mos with severe anxiety.  Denies panic attacks.  - Utox positive for amphetamine  - Pt interested in rehab.  Pt to attend [facility]
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
## 2017-03-30 NOTE — NURSING
Initial contact with pt today, pt in group eating snack. Pt calm, pleasant, compliant, will continue to monitor.
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
## 2017-04-03 ENCOUNTER — NURSE TRIAGE (OUTPATIENT)
Dept: ADMINISTRATIVE | Facility: CLINIC | Age: 27
End: 2017-04-03
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
## 2017-04-03 NOTE — PROGRESS NOTES
sw received phone call from pt's mother Morenita today and [place]. Patient did not go to assessment appointment with [facility] as discussed in family meeting on day of discharge. Mother requested help with discharge information to rehab: fax: [phone].

## 2017-04-05 NOTE — PT/OT/SLP DISCHARGE
Occupational Therapy Discharge Summary    Tyler Glynn  MRN: [id number]   Substance induced mood disorder   Patient Discharged from acute Occupational Therapy on 3/30/17.  Please refer to prior OT note dated on 3/29/17 for functional status.     Assessment:   Patient appropriate for care in another setting.  GOALS:   1. Pt will attend group with Min encouragement. MET  2. Pt will remain in group 75% of session. MET  3. Pt will participate in group with zero encouragement. NOT MET  4. Pt will participate in group problem solving with zero verbal cues. NOT MET  5. Pt will interact with 1-2 group members in immediate environment during session. MET  6. Pt will verbalize/demonstrate understanding of group purpose with 1 verbal cues at end of session. MET  7. Pt will report and demo understanding of 1 positive self-affirmation to use to as coping skills. MET  8. Pt will verbalize/demo understanding and identify use of 1-2 coping skills to use when upset. MET  9. Pt will identify 2-3 strategies for developing better sleep routine. PROGRESSING      Patient Goals:  1.To get into an Outpatient Rehab facility  2. To sleep better  3. To finish current speaker project    Reasons for Discontinuation of Therapy Services  Transfer to alternate level of care.      Plan:  Patient Discharged to: Inpatient Psychiatric facility.    RICARDO Farley  4/5/2017